# Patient Record
Sex: MALE | Race: WHITE | NOT HISPANIC OR LATINO | Employment: OTHER | URBAN - METROPOLITAN AREA
[De-identification: names, ages, dates, MRNs, and addresses within clinical notes are randomized per-mention and may not be internally consistent; named-entity substitution may affect disease eponyms.]

---

## 2022-06-26 ENCOUNTER — APPOINTMENT (EMERGENCY)
Dept: RADIOLOGY | Facility: HOSPITAL | Age: 47
End: 2022-06-26
Payer: COMMERCIAL

## 2022-06-26 ENCOUNTER — HOSPITAL ENCOUNTER (EMERGENCY)
Facility: HOSPITAL | Age: 47
Discharge: HOME | End: 2022-06-26
Attending: INTERNAL MEDICINE | Admitting: INTERNAL MEDICINE
Payer: COMMERCIAL

## 2022-06-26 VITALS
SYSTOLIC BLOOD PRESSURE: 126 MMHG | WEIGHT: 185 LBS | DIASTOLIC BLOOD PRESSURE: 84 MMHG | TEMPERATURE: 98.1 F | RESPIRATION RATE: 16 BRPM | OXYGEN SATURATION: 100 % | HEIGHT: 72 IN | HEART RATE: 78 BPM | BODY MASS INDEX: 25.06 KG/M2

## 2022-06-26 DIAGNOSIS — S90.31XA CONTUSION OF RIGHT HEEL, INITIAL ENCOUNTER: Primary | ICD-10-CM

## 2022-06-26 PROCEDURE — 99284 EMERGENCY DEPT VISIT MOD MDM: CPT | Performed by: PHYSICIAN ASSISTANT

## 2022-06-26 PROCEDURE — 99283 EMERGENCY DEPT VISIT LOW MDM: CPT

## 2022-06-26 PROCEDURE — 73630 X-RAY EXAM OF FOOT: CPT

## 2022-06-26 NOTE — ED NOTES
David Finnegan applying surgical shoe and giving crutches     Sanjuana Dallas, [address] [facility]  06/26/22 4517
medicine team

## 2022-06-26 NOTE — DISCHARGE INSTRUCTIONS
Please return to the emergency department for worsening symptoms including chest pain, shortness of breath, dizziness, lightheadedness, fever greater than 103, severe pain, inability to walk, fainting episodes, etc  Please follow-up with your family practice provider as soon as possible  Please continue using the hard shoe in addition to crutches for comfort  Please follow-up with Podiatry for your foot injury  We will call you once the expert read the film  If there are any changes to our read here in the emergency department, we will give you a call

## 2022-06-27 NOTE — ED PROVIDER NOTES
History  Chief Complaint   Patient presents with    Foot Injury     Pt was riding an electric scooter and fell, reports hitting his right heel  This is a 80-year-old male with no significant past medical history presenting to the emergency department today for right-sided heel pain  Approximately 2 hours prior to arrival the patient was riding an electric skateboard and fell off landing on his right heel  He is noting pain specifically to the right heel that is nonradiating  He denies any swelling or ecchymosis  He is having difficulty walking secondary to the pain  He has no forefoot or midfoot pain  He has no ankle or knee pain  He has no prior trauma to the right foot  No head strike or neck pain  The patient denies other complaints at this time  History provided by:  Patient   used: No    Foot Injury - Major  Location:  Foot  Time since incident:  2 hours  Injury: yes    Foot location:  R foot  Chronicity:  New  Dislocation: no    Foreign body present:  No foreign bodies  Tetanus status:  Unknown  Prior injury to area:  No  Relieved by:  None tried  Worsened by:  Bearing weight  Ineffective treatments:  None tried  Associated symptoms: no back pain, no decreased ROM, no fatigue, no fever, no itching, no muscle weakness, no neck pain, no numbness, no stiffness, no swelling and no tingling        None       Past Medical History:   Diagnosis Date    Asthma        Past Surgical History:   Procedure Laterality Date    CERVICAL SPINE SURGERY         History reviewed  No pertinent family history  I have reviewed and agree with the history as documented  E-Cigarette/Vaping    E-Cigarette Use Never User      E-Cigarette/Vaping Substances     Social History     Tobacco Use    Smoking status: Never Smoker    Smokeless tobacco: Never Used   Vaping Use    Vaping Use: Never used   Substance Use Topics    Alcohol use:  Yes    Drug use: Never       Review of Systems Constitutional: Negative for appetite change, chills, diaphoresis, fatigue and fever  Eyes: Negative for visual disturbance  Respiratory: Negative for cough, chest tightness, shortness of breath and wheezing  Cardiovascular: Negative for chest pain, palpitations and leg swelling  Gastrointestinal: Negative for abdominal pain, constipation, diarrhea, nausea and vomiting  Musculoskeletal: Positive for arthralgias (right heel pain)  Negative for back pain, neck pain, neck stiffness and stiffness  Skin: Negative for color change, itching, rash and wound  Neurological: Negative for dizziness, seizures, syncope, weakness, light-headedness, numbness and headaches  Psychiatric/Behavioral: Negative for confusion  All other systems reviewed and are negative  Physical Exam  Physical Exam  Vitals and nursing note reviewed  Constitutional:       General: He is not in acute distress  Appearance: Normal appearance  He is normal weight  He is not ill-appearing, toxic-appearing or diaphoretic  HENT:      Head: Normocephalic and atraumatic  Nose: Nose normal  No congestion or rhinorrhea  Mouth/Throat:      Mouth: Mucous membranes are moist       Pharynx: No oropharyngeal exudate or posterior oropharyngeal erythema  Eyes:      General: No scleral icterus  Right eye: No discharge  Left eye: No discharge  Conjunctiva/sclera: Conjunctivae normal    Cardiovascular:      Rate and Rhythm: Normal rate and regular rhythm  Pulses: Normal pulses  Heart sounds: Normal heart sounds  No murmur heard  No friction rub  No gallop  Pulmonary:      Effort: Pulmonary effort is normal  No respiratory distress  Breath sounds: Normal breath sounds  No stridor  No wheezing, rhonchi or rales  Chest:      Chest wall: No tenderness  Musculoskeletal:         General: Normal range of motion  Cervical back: Normal range of motion  No rigidity        Right lower leg: No edema  Left lower leg: No edema  Comments: The patient has right heel tenderness to palpation both medially, laterally, and dorsally  He has no tenderness to palpation to the forefoot, midfoot, or ankle  He has no tenderness to palpation of the right leg or to the right knee  Normal ability for bilateral dorsiflexion, plantar flexion, eversion, and inversion of the bilateral ankles   Skin:     General: Skin is warm and dry  Capillary Refill: Capillary refill takes less than 2 seconds  Coloration: Skin is not jaundiced or pale  Neurological:      General: No focal deficit present  Mental Status: He is alert and oriented to person, place, and time  Mental status is at baseline  Comments: 5/5 strength in bilateral lower extremities  Normal sensation of bilateral lower extremities   Psychiatric:         Mood and Affect: Mood normal          Behavior: Behavior normal          Vital Signs  ED Triage Vitals [06/26/22 1531]   Temperature Pulse Respirations Blood Pressure SpO2   98 1 °F (36 7 °C) 78 16 126/84 100 %      Temp Source Heart Rate Source Patient Position - Orthostatic VS BP Location FiO2 (%)   Oral Monitor -- -- --      Pain Score       9           Vitals:    06/26/22 1531   BP: 126/84   Pulse: 78         Visual Acuity      ED Medications  Medications - No data to display    Diagnostic Studies  Results Reviewed     None                 XR foot 3+ views RIGHT   ED Interpretation by Robert Wilkins PA-C (06/26 1547)   No acute osseous abnormalities                 Procedures  Procedures         ED Course  ED Course as of 06/26/22 2017   [place] Jun 26, 2022   1550 No acute osseous abnormalities on foot x-ray  Will place in surgical shoe and give crutches for comfort  MDM  Number of Diagnoses or Management Options  Contusion of right heel, initial encounter: new and requires workup  Diagnosis management comments:  This is a 69-year-old male presenting to the emergency department today for right-sided heel pain  He fell off an electric skateboard 2 hours prior to arrival   Pain is located only to the right heel  He has no pain to the right forefoot, midfoot, ankle, leg, or knee  Vital signs are stable  On physical examination, the patient has no ecchymosis or swelling to the right heel  He does have tenderness to palpation dorsally, laterally, and medially  No acute osseous abnormality on x-ray  The patient is stable for discharge at this time  The patient was placed in a hard shoe and given crutches to remain nonweightbearing on the right lower extremity  Recommend podiatry follow-up if symptoms do not improve  Tylenol and Motrin for pain relief  Strict return precautions were given  Recommend PCP follow-up as soon as possible  The patient and/or patient's proxy verify their understanding and agree to the plan at this time  All questions answered to the patient and/or their proxy's satisfaction  All labs reviewed and utilized in the medical decision making process  All radiology studies independently viewed by me and interpreted by the radiologist  Portions of the record may have been created with voice recognition software   Occasional wrong word or "sound a like" substitutions may have occurred due to the inherent limitations of voice recognition software   Read the chart carefully and recognize, using context, where substitutions have occurred         Amount and/or Complexity of Data Reviewed  Tests in the radiology section of CPT®: ordered and reviewed  Independent visualization of images, tracings, or specimens: yes (XR Right Foot)    Patient Progress  Patient progress: stable      Disposition  Final diagnoses:   Contusion of right heel, initial encounter     Time reflects when diagnosis was documented in both MDM as applicable and the Disposition within this note     Time User Action Codes Description Comment    6/26/2022  3:52 PM Cuate Perez Add [S90 31XA] Contusion of right heel, initial encounter       ED Disposition     ED Disposition   Discharge    Condition   Stable    Date/Time   Sun Jun 26, 2022  3:52 PM    [address] discharge to home/self care  Follow-up Information     Follow up With Specialties Details Why Contact Info Additional [address] Emergency Department Emergency Medicine Schedule an appointment as soon as possible for a visit   [address] [address]  [address] [facility] Emergency Department, [address], [address] [facility] Family Medicine Go to  If symptoms worsen [address] [address]  [address] , [place], [place], [address] [place] [address] Podiatry Schedule an appointment as soon as possible for a visit   [address] [address]  [phone] [address] [address] [facility], [address] [place] [address]  [phone]          There are no discharge medications for this patient  No discharge procedures on file      PDMP Review     None          ED Provider  Electronically Signed by           Teresa Manuel PA-C  06/26/22 2035